# Patient Record
Sex: MALE | Race: WHITE | NOT HISPANIC OR LATINO | URBAN - METROPOLITAN AREA
[De-identification: names, ages, dates, MRNs, and addresses within clinical notes are randomized per-mention and may not be internally consistent; named-entity substitution may affect disease eponyms.]

---

## 2024-10-25 ENCOUNTER — TELEMEDICINE (OUTPATIENT)
Dept: FAMILY MEDICINE CLINIC | Facility: TELEHEALTH | Age: 19
End: 2024-10-25
Payer: COMMERCIAL

## 2024-10-25 VITALS — WEIGHT: 200 LBS | HEART RATE: 69 BPM | TEMPERATURE: 98.2 F | BODY MASS INDEX: 28.63 KG/M2 | HEIGHT: 70 IN

## 2024-10-25 DIAGNOSIS — H66.91 RIGHT OTITIS MEDIA, UNSPECIFIED OTITIS MEDIA TYPE: Primary | ICD-10-CM

## 2024-10-25 RX ORDER — AMOXICILLIN 875 MG
875 TABLET ORAL 2 TIMES DAILY
Qty: 14 TABLET | Refills: 0 | Status: SHIPPED | OUTPATIENT
Start: 2024-10-25 | End: 2024-11-01

## 2024-10-25 RX ORDER — IBUPROFEN 600 MG/1
600 TABLET, FILM COATED ORAL EVERY 6 HOURS PRN
Qty: 28 TABLET | Refills: 0 | Status: SHIPPED | OUTPATIENT
Start: 2024-10-25

## 2024-10-25 NOTE — PROGRESS NOTES
"You have chosen to receive care through a telehealth visit.  Do you consent to use a video/audio connection for your medical care today? Yes     HPI  Tato Chan is a 19 y.o. male  presents with complaint of ear pain.  It is his right ear.  It has been hurting him for the past 4 to 5 days.  He does have discharge from the right ear.  His pain level is a #8.  He has not taken anything for the pain.  He has used Q-tips and Debrox in the ear.     Review of Systems   Constitutional:  Positive for fatigue. Negative for fever.   HENT:  Positive for ear pain (right).    Gastrointestinal:  Positive for nausea.       Past Medical History:   Diagnosis Date    ADHD (attention deficit hyperactivity disorder)     Anxiety        No family history on file.    Social History     Socioeconomic History    Marital status: Single   Tobacco Use    Smoking status: Every Day     Types: Cigarettes   Vaping Use    Vaping status: Never Used       Tato Chan  reports that he has been smoking cigarettes. He does not have any smokeless tobacco history on file. I have educated him on the risk of diseases from using tobacco products such as cancer, COPD, and heart disease.     I advised him to quit and he is not willing to quit.    I spent  1  minutes counseling the patient.          Pulse 69   Temp 98.2 °F (36.8 °C)   Ht 177.8 cm (70\")   Wt 90.7 kg (200 lb)   BMI 28.70 kg/m²     PHYSICAL EXAM  Physical Exam   Constitutional: He is oriented to person, place, and time. He appears well-developed.   HENT:   Head: Normocephalic and atraumatic.   Right Ear: There is tenderness.   Nose: Nose normal.   Mild right lower facial edema noted, right auditory canal erythematous, right TM erythematous   Eyes: Lids are normal. Right eye exhibits no discharge. Left eye exhibits no discharge. Right conjunctiva is not injected. Left conjunctiva is not injected.   Cardiovascular:       Heart sounds normal   Pulmonary/Chest:  No respiratory distress " (Lungs clear).  Lymphadenopathy:        Right cervical: Anterior cervical (Patient directed exam) adenopathy present.   Neurological: He is alert and oriented to person, place, and time. No cranial nerve deficit.   Psychiatric: He has a normal mood and affect. His speech is normal and behavior is normal. Judgment and thought content normal.       No results found for this or any previous visit.    Diagnoses and all orders for this visit:    1. Right otitis media, unspecified otitis media type (Primary)  -     ibuprofen (ADVIL,MOTRIN) 600 MG tablet; Take 1 tablet by mouth Every 6 (Six) Hours As Needed for Mild Pain.  Dispense: 28 tablet; Refill: 0    Other orders  -     amoxicillin (AMOXIL) 875 MG tablet; Take 1 tablet by mouth 2 (Two) Times a Day for 7 days.  Dispense: 14 tablet; Refill: 0    Amoxicillin as directed  Probiotics for two weeks related to taking antibiotics. The pharmacist can help you with this if needed. Do not take within two hours of antibiotic.  Ibuprofen 600 mg as needed and directed for pain or fever take with food    FOLLOW-UP  If symptoms worsen or persist follow up with PCPor ENT    Patient verbalizes understanding of medication dosage, comfort measures, instructions for treatment and follow-up.    Rolanda Soto, MORENA  10/25/2024  12:08 EDT    The use of a video visit has been reviewed with the patient and verbal informed consent has been obtained. Myself and Tato Chan participated in this visit. The patient is located in [address] [place] [address].    I am located in [place], [place]. [employer] and [employer] were utilized. I spent 26 minutes in the patient's chart for this visit.

## 2024-11-11 ENCOUNTER — TELEMEDICINE (OUTPATIENT)
Dept: FAMILY MEDICINE CLINIC | Facility: TELEHEALTH | Age: 19
End: 2024-11-11
Payer: COMMERCIAL

## 2024-11-11 DIAGNOSIS — J03.90 TONSILLITIS: Primary | ICD-10-CM

## 2024-11-11 PROCEDURE — 99213 OFFICE O/P EST LOW 20 MIN: CPT | Performed by: NURSE PRACTITIONER

## 2024-11-11 RX ORDER — AZITHROMYCIN 250 MG/1
TABLET, FILM COATED ORAL
Qty: 6 TABLET | Refills: 0 | Status: SHIPPED | OUTPATIENT
Start: 2024-11-11

## 2024-11-11 RX ORDER — CETIRIZINE HYDROCHLORIDE 10 MG/1
10 TABLET ORAL DAILY
Qty: 30 TABLET | Refills: 0 | Status: SHIPPED | OUTPATIENT
Start: 2024-11-11

## 2024-11-11 RX ORDER — FLUTICASONE PROPIONATE 50 MCG
2 SPRAY, SUSPENSION (ML) NASAL DAILY
Qty: 16 G | Refills: 0 | Status: SHIPPED | OUTPATIENT
Start: 2024-11-11 | End: 2024-12-11

## 2024-11-11 NOTE — PROGRESS NOTES
No chief complaint on file.      Video Visit Reason:   Free Text Description: Amoxicillin  Subjective   Tato Chan is a 19 y.o. male.     History of Present Illness  Sore throat for several weeks with drainage and ear congestion. He had ear infection and was treated with an antibiotic but ear pain came back. He has been taking mucinex and it will thin mucus. He has been having so much mucus that he cannot sleep due to drainage making his throat sore, cough and nasal congestion obstruction his breathing.      The following portions of the patient's history were reviewed and updated as appropriate: allergies, current medications, past medical history, and problem list.      Past Medical History:   Diagnosis Date    ADHD (attention deficit hyperactivity disorder)     Anxiety      Social History     Socioeconomic History    Marital status: Single   Tobacco Use    Smoking status: Every Day     Types: Cigarettes   Vaping Use    Vaping status: Never Used     medication documentation: reviewed and updated with patient and   Current Outpatient Medications:     azithromycin (Zithromax Z-Manjinder) 250 MG tablet, Take 2 tabs on Day 1, then 1 tab daily for 4 additional days, Disp: 6 tablet, Rfl: 0    cetirizine (zyrTEC) 10 MG tablet, Take 1 tablet by mouth Daily., Disp: 30 tablet, Rfl: 0    fluticasone (FLONASE) 50 MCG/ACT nasal spray, Administer 2 sprays into the nostril(s) as directed by provider Daily for 30 days. Administer 2 sprays in each nostril for each dose., Disp: 16 g, Rfl: 0    ibuprofen (ADVIL,MOTRIN) 600 MG tablet, Take 1 tablet by mouth Every 6 (Six) Hours As Needed for Mild Pain., Disp: 28 tablet, Rfl: 0  Review of Systems   Constitutional:  Positive for fatigue. Negative for fever.   HENT:  Positive for congestion, ear pain, postnasal drip and rhinorrhea.    Gastrointestinal:  Negative for nausea.   Allergic/Immunologic: Negative for environmental allergies.   Neurological:  Negative for dizziness and  headaches.       Objective   Physical Exam  Constitutional:       General: He is not in acute distress.     Appearance: He is not ill-appearing.   HENT:      Mouth/Throat:      Pharynx: Uvula midline. Posterior oropharyngeal erythema and postnasal drip present. No pharyngeal swelling, oropharyngeal exudate or uvula swelling.      Tonsils: No tonsillar exudate. 3+ on the right. 3+ on the left.   Neurological:      Mental Status: He is alert.         Assessment & Plan   Diagnoses and all orders for this visit:    1. Tonsillitis (Primary)  -     azithromycin (Zithromax Z-Manjinder) 250 MG tablet; Take 2 tabs on Day 1, then 1 tab daily for 4 additional days  Dispense: 6 tablet; Refill: 0  -     fluticasone (FLONASE) 50 MCG/ACT nasal spray; Administer 2 sprays into the nostril(s) as directed by provider Daily for 30 days. Administer 2 sprays in each nostril for each dose.  Dispense: 16 g; Refill: 0  -     cetirizine (zyrTEC) 10 MG tablet; Take 1 tablet by mouth Daily.  Dispense: 30 tablet; Refill: 0       Discussed antihistamine and nasal steroid use to decrease drainage.             Follow Up:  If your symptoms are not resolving by the completion of your treatment or are worsening, see your primary care provider for follow up. If you don't have a primary care provider, you may go to any Urgent Care for re-evaluation. If you develop any life threatening symptoms, go to the nearest Emergency Department immediately or call EMS.               The use of  Video Visit was utilized during this visit, using both [employer] and [employer]. The use of a video visit has been reviewed with the patient and verbal informed consent has been obtained. No technical difficulties occurred during the visit.    is located at [address] [place] [id number]  Provider is located at [place], [place]

## 2024-11-11 NOTE — PATIENT INSTRUCTIONS
Tonsillitis    Tonsillitis is an infection of the throat. Tonsils are tissues in the back of your throat. This infection causes the tonsils to become red, tender, and swollen.  What are the causes?  Tonsillitis is caused by germs (bacteria or a virus). This condition can also occur when pieces of food and bacteria build up around the tonsils.  Tonsillitis that is caused by germs can spread from person to person.  What are the signs or symptoms?  A sore throat.  Trouble swallowing.  White patches on the tonsils.  Swollen tonsils.  Fever.  Headache.  Tiredness.  Not feeling hungry.  Snoring during sleep when you did not snore before.  Foul-smelling, yellowish-white pieces of material that you cough up or spit out. These can cause bad breath.  How is this treated?  Medicines. These can be given to treat pain, swelling, or fever. They can also be given to kill bacteria.  Surgery to take out the tonsils. This is done if you have very bad infections that do not go away.  Follow these instructions at home:  Medicines  Take over-the-counter and prescription medicines only as told by your doctor.  If you were prescribed an antibiotic medicine, take it as told by your doctor. Do not stop taking the antibiotic even if you start to feel better.  Eating and drinking  Drink enough fluid to keep your pee (urine) pale yellow.  While your throat is sore, eat soft or liquid foods, such as:  Soup.  Sherbet.  Soft, warm cereals, such as oatmeal or hot wheat cereal.  Drink warm fluids.  Eat frozen ice pops.  General instructions  Rest as much as you can, and get plenty of sleep.  Rinse your mouth often with salt water.  To make salt water, dissolve ½-1 tsp (3-6 g) of salt in 1 cup (237 mL) of warm water.  Do not swallow the salt water.  Wash your hands often with soap and water for at least 20 seconds. If there is no soap and water, use hand .  Do not share cups, bottles, or other utensils until your symptoms are gone.  Do not  smoke or use any products that contain nicotine or tobacco. If you need help quitting, ask your doctor.  Keep all follow-up visits.  Contact a doctor if:  You have large, tender lumps in your neck that are new.  You have a fever that does not go away after 2-3 days.  You have a rash.  You cough up green, yellow-brown, or bloody fluid.  You cannot swallow liquids or food for 24 hours.  Only one of your tonsils is swollen.  Get help right away if:  You have any new symptoms such as:  Vomiting.  Very bad headache.  Stiff neck.  Chest pain.  Trouble breathing or swallowing.  You have very bad throat pain, and you also have drooling or voice changes.  You have very bad pain that is not helped by medicine.  You cannot fully open your mouth.  You have redness, swelling, or very bad pain anywhere in your neck.  Summary  Tonsillitis is an infection of the throat. It causes your tonsils to be red, tender, and swollen.  While your throat is sore, eat soft or liquid foods.  Rinse your mouth often with salt water.  Do not share cups, bottles, or other utensils until your symptoms are gone.  This information is not intended to replace advice given to you by your health care provider. Make sure you discuss any questions you have with your health care provider.  Document Revised: 05/12/2022 Document Reviewed: 05/12/2022  Elsevier Patient Education © 2024 [employer]